# Patient Record
Sex: FEMALE | Race: WHITE | NOT HISPANIC OR LATINO | Employment: FULL TIME | URBAN - METROPOLITAN AREA
[De-identification: names, ages, dates, MRNs, and addresses within clinical notes are randomized per-mention and may not be internally consistent; named-entity substitution may affect disease eponyms.]

---

## 2023-12-11 DIAGNOSIS — Z30.41 FAMILY PLANNING, BCP (BIRTH CONTROL PILLS) MAINTENANCE: Primary | ICD-10-CM

## 2023-12-11 RX ORDER — LEVONORGESTREL/ETHINYL ESTRADIOL AND ETHINYL ESTRADIOL 100-20(84)
1 KIT ORAL EVERY 24 HOURS
COMMUNITY
Start: 2023-06-07 | End: 2023-12-11 | Stop reason: SDUPTHER

## 2023-12-11 RX ORDER — LEVONORGESTREL/ETHINYL ESTRADIOL AND ETHINYL ESTRADIOL 100-20(84)
1 KIT ORAL EVERY 24 HOURS
Qty: 84 TABLET | Refills: 1 | Status: SHIPPED | OUTPATIENT
Start: 2023-12-11 | End: 2024-05-27

## 2025-03-23 ENCOUNTER — APPOINTMENT (OUTPATIENT)
Dept: RADIOLOGY | Facility: HOSPITAL | Age: 50
End: 2025-03-23

## 2025-03-23 ENCOUNTER — HOSPITAL ENCOUNTER (EMERGENCY)
Facility: HOSPITAL | Age: 50
Discharge: HOME | End: 2025-03-23

## 2025-03-23 VITALS
HEIGHT: 65 IN | OXYGEN SATURATION: 98 % | WEIGHT: 255 LBS | SYSTOLIC BLOOD PRESSURE: 137 MMHG | HEART RATE: 69 BPM | DIASTOLIC BLOOD PRESSURE: 86 MMHG | TEMPERATURE: 97.9 F | BODY MASS INDEX: 42.49 KG/M2 | RESPIRATION RATE: 16 BRPM

## 2025-03-23 DIAGNOSIS — N93.9 ABNORMAL UTERINE BLEEDING: ICD-10-CM

## 2025-03-23 DIAGNOSIS — R10.2 PELVIC CRAMPING: Primary | ICD-10-CM

## 2025-03-23 LAB
ABO GROUP (TYPE) IN BLOOD: NORMAL
ALBUMIN SERPL BCP-MCNC: 3.8 G/DL (ref 3.4–5)
ALP SERPL-CCNC: 73 U/L (ref 33–110)
ALT SERPL W P-5'-P-CCNC: 13 U/L (ref 7–45)
ANION GAP SERPL CALCULATED.3IONS-SCNC: 10 MMOL/L (ref 10–20)
ANTIBODY SCREEN: NORMAL
APPEARANCE UR: ABNORMAL
AST SERPL W P-5'-P-CCNC: 12 U/L (ref 9–39)
BASOPHILS # BLD AUTO: 0.07 X10*3/UL (ref 0–0.1)
BASOPHILS NFR BLD AUTO: 0.8 %
BILIRUB SERPL-MCNC: 0.5 MG/DL (ref 0–1.2)
BILIRUB UR STRIP.AUTO-MCNC: NEGATIVE MG/DL
BUN SERPL-MCNC: 16 MG/DL (ref 6–23)
CALCIUM SERPL-MCNC: 8.7 MG/DL (ref 8.6–10.3)
CHLORIDE SERPL-SCNC: 108 MMOL/L (ref 98–107)
CO2 SERPL-SCNC: 25 MMOL/L (ref 21–32)
COLOR UR: ABNORMAL
CREAT SERPL-MCNC: 0.66 MG/DL (ref 0.5–1.05)
EGFRCR SERPLBLD CKD-EPI 2021: >90 ML/MIN/1.73M*2
EOSINOPHIL # BLD AUTO: 0.09 X10*3/UL (ref 0–0.7)
EOSINOPHIL NFR BLD AUTO: 1 %
ERYTHROCYTE [DISTWIDTH] IN BLOOD BY AUTOMATED COUNT: 13 % (ref 11.5–14.5)
GLUCOSE SERPL-MCNC: 92 MG/DL (ref 74–99)
GLUCOSE UR STRIP.AUTO-MCNC: NORMAL MG/DL
HCG UR QL IA.RAPID: NEGATIVE
HCT VFR BLD AUTO: 36.6 % (ref 36–46)
HGB BLD-MCNC: 12.3 G/DL (ref 12–16)
HOLD SPECIMEN: NORMAL
IMM GRANULOCYTES # BLD AUTO: 0.02 X10*3/UL (ref 0–0.7)
IMM GRANULOCYTES NFR BLD AUTO: 0.2 % (ref 0–0.9)
KETONES UR STRIP.AUTO-MCNC: ABNORMAL MG/DL
LEUKOCYTE ESTERASE UR QL STRIP.AUTO: ABNORMAL
LIPASE SERPL-CCNC: 21 U/L (ref 9–82)
LYMPHOCYTES # BLD AUTO: 1.68 X10*3/UL (ref 1.2–4.8)
LYMPHOCYTES NFR BLD AUTO: 18.6 %
MCH RBC QN AUTO: 29.4 PG (ref 26–34)
MCHC RBC AUTO-ENTMCNC: 33.6 G/DL (ref 32–36)
MCV RBC AUTO: 88 FL (ref 80–100)
MONOCYTES # BLD AUTO: 0.42 X10*3/UL (ref 0.1–1)
MONOCYTES NFR BLD AUTO: 4.7 %
NEUTROPHILS # BLD AUTO: 6.75 X10*3/UL (ref 1.2–7.7)
NEUTROPHILS NFR BLD AUTO: 74.7 %
NITRITE UR QL STRIP.AUTO: NEGATIVE
NRBC BLD-RTO: 0 /100 WBCS (ref 0–0)
PH UR STRIP.AUTO: 5 [PH]
PLATELET # BLD AUTO: 217 X10*3/UL (ref 150–450)
POTASSIUM SERPL-SCNC: 4 MMOL/L (ref 3.5–5.3)
PROT SERPL-MCNC: 6.7 G/DL (ref 6.4–8.2)
PROT UR STRIP.AUTO-MCNC: ABNORMAL MG/DL
RBC # BLD AUTO: 4.18 X10*6/UL (ref 4–5.2)
RBC # UR STRIP.AUTO: ABNORMAL MG/DL
RBC #/AREA URNS AUTO: >20 /HPF
RH FACTOR (ANTIGEN D): NORMAL
SODIUM SERPL-SCNC: 139 MMOL/L (ref 136–145)
SP GR UR STRIP.AUTO: 1.02
UROBILINOGEN UR STRIP.AUTO-MCNC: NORMAL MG/DL
WBC # BLD AUTO: 9 X10*3/UL (ref 4.4–11.3)
WBC #/AREA URNS AUTO: ABNORMAL /HPF

## 2025-03-23 PROCEDURE — 86901 BLOOD TYPING SEROLOGIC RH(D): CPT

## 2025-03-23 PROCEDURE — 87186 SC STD MICRODIL/AGAR DIL: CPT | Mod: WESLAB

## 2025-03-23 PROCEDURE — 76856 US EXAM PELVIC COMPLETE: CPT | Performed by: RADIOLOGY

## 2025-03-23 PROCEDURE — 36415 COLL VENOUS BLD VENIPUNCTURE: CPT

## 2025-03-23 PROCEDURE — 76830 TRANSVAGINAL US NON-OB: CPT | Performed by: RADIOLOGY

## 2025-03-23 PROCEDURE — 83690 ASSAY OF LIPASE: CPT

## 2025-03-23 PROCEDURE — 80053 COMPREHEN METABOLIC PANEL: CPT

## 2025-03-23 PROCEDURE — 2500000001 HC RX 250 WO HCPCS SELF ADMINISTERED DRUGS (ALT 637 FOR MEDICARE OP)

## 2025-03-23 PROCEDURE — 76856 US EXAM PELVIC COMPLETE: CPT

## 2025-03-23 PROCEDURE — 81001 URINALYSIS AUTO W/SCOPE: CPT

## 2025-03-23 PROCEDURE — 81025 URINE PREGNANCY TEST: CPT

## 2025-03-23 PROCEDURE — 85025 COMPLETE CBC W/AUTO DIFF WBC: CPT

## 2025-03-23 PROCEDURE — 99284 EMERGENCY DEPT VISIT MOD MDM: CPT | Mod: 25

## 2025-03-23 RX ORDER — MEDROXYPROGESTERONE ACETATE 5 MG/1
10 TABLET ORAL 2 TIMES DAILY
Qty: 40 TABLET | Refills: 0 | Status: SHIPPED | OUTPATIENT
Start: 2025-03-23 | End: 2025-05-13 | Stop reason: ALTCHOICE

## 2025-03-23 RX ORDER — IBUPROFEN 400 MG/1
400 TABLET, FILM COATED ORAL ONCE
Status: COMPLETED | OUTPATIENT
Start: 2025-03-23 | End: 2025-03-23

## 2025-03-23 RX ORDER — ACETAMINOPHEN 325 MG/1
975 TABLET ORAL ONCE
Status: COMPLETED | OUTPATIENT
Start: 2025-03-23 | End: 2025-03-23

## 2025-03-23 RX ADMIN — ACETAMINOPHEN 975 MG: 325 TABLET, FILM COATED ORAL at 09:46

## 2025-03-23 RX ADMIN — IBUPROFEN 400 MG: 400 TABLET, FILM COATED ORAL at 09:46

## 2025-03-23 ASSESSMENT — LIFESTYLE VARIABLES
TOTAL SCORE: 0
EVER FELT BAD OR GUILTY ABOUT YOUR DRINKING: NO
HAVE PEOPLE ANNOYED YOU BY CRITICIZING YOUR DRINKING: NO
HAVE YOU EVER FELT YOU SHOULD CUT DOWN ON YOUR DRINKING: NO
EVER HAD A DRINK FIRST THING IN THE MORNING TO STEADY YOUR NERVES TO GET RID OF A HANGOVER: NO

## 2025-03-23 ASSESSMENT — PAIN SCALES - GENERAL
PAINLEVEL_OUTOF10: 3
PAINLEVEL_OUTOF10: 10 - WORST POSSIBLE PAIN
PAINLEVEL_OUTOF10: 0 - NO PAIN

## 2025-03-23 ASSESSMENT — COLUMBIA-SUICIDE SEVERITY RATING SCALE - C-SSRS
1. IN THE PAST MONTH, HAVE YOU WISHED YOU WERE DEAD OR WISHED YOU COULD GO TO SLEEP AND NOT WAKE UP?: NO
6. HAVE YOU EVER DONE ANYTHING, STARTED TO DO ANYTHING, OR PREPARED TO DO ANYTHING TO END YOUR LIFE?: NO
2. HAVE YOU ACTUALLY HAD ANY THOUGHTS OF KILLING YOURSELF?: NO

## 2025-03-23 ASSESSMENT — PAIN DESCRIPTION - PAIN TYPE: TYPE: ACUTE PAIN

## 2025-03-23 ASSESSMENT — PAIN DESCRIPTION - LOCATION
LOCATION: PELVIS
LOCATION: ABDOMEN

## 2025-03-23 ASSESSMENT — PAIN - FUNCTIONAL ASSESSMENT: PAIN_FUNCTIONAL_ASSESSMENT: 0-10

## 2025-03-23 NOTE — ED PROVIDER NOTES
HPI   Chief Complaint   Patient presents with    Pelvic Pain     Since 3 a.m.       Patient is a 49-year-old female presents emergency department for evaluation of pelvic pain and heavy vaginal bleeding.  Patient states that she started her menstrual cycle this past Friday.  She states this morning when she woke up she had worsening pelvic pain which is worse than her typical menstrual cramps.  She states that she has had worsening vaginal bleeding and states it significantly heavy.  She states that she typically goes through 3 pads in a day, but states that today she is already gone through 2 pads since 3 AM.  She states the pain feels like a cramping stabbing sensation in the left adnexal area.  She denies any associated urinary symptoms or changes in bowel movements.  She denies any nausea or vomiting, fevers, chills, lightheadedness, dizziness.  She is not sexually active and is not concerned about STDs at this time.  She denies history of heavy vaginal cycles and follows with OB/GYN Dr. Oneill.      History provided by:  Patient   used: No            Patient History   No past medical history on file.  No past surgical history on file.  No family history on file.  Social History     Tobacco Use    Smoking status: Not on file    Smokeless tobacco: Not on file   Substance Use Topics    Alcohol use: Not on file    Drug use: Not on file       Physical Exam   ED Triage Vitals [03/23/25 0903]   Temperature Heart Rate Respirations BP   36.6 °C (97.9 °F) 67 18 (!) 147/100      Pulse Ox Temp Source Heart Rate Source Patient Position   97 % Temporal Monitor Sitting      BP Location FiO2 (%)     Right arm --       Physical Exam  Constitutional:       Appearance: Normal appearance.   Cardiovascular:      Rate and Rhythm: Normal rate and regular rhythm.   Pulmonary:      Effort: Pulmonary effort is normal.      Breath sounds: Normal breath sounds.   Abdominal:      General: Abdomen is flat.       Palpations: Abdomen is soft.      Tenderness: There is abdominal tenderness.      Comments: Tenderness to palpation to LLQ.   Musculoskeletal:         General: Normal range of motion.   Skin:     General: Skin is warm and dry.   Neurological:      General: No focal deficit present.      Mental Status: She is alert and oriented to person, place, and time.           ED Course & MDM   ED Course as of 03/25/25 1541   Sun Mar 23, 2025   1329 Spoke with OBGYN on call Dr. Zavala. If she is bleeding a significant amount can start provera 10 mg bid for 10 days and follow-up outpatient with OBGYN. [AF]      ED Course User Index  [AF] Vanessa Ziegler PA-C         Diagnoses as of 03/25/25 1541   Pelvic cramping   Abnormal uterine bleeding                 No data recorded     [place] Coma Scale Score: 15 (03/23/25 1345 : Elsa Tinsley RN)                           Medical Decision Making  Patient is a 49-year-old female presents emergency department for evaluation of pelvic pain and heavy vaginal bleeding.    Lab work done today included CMP, CBC, lipase, type and screen, urinalysis, urine pregnancy.  Lab work with urine showing leukocyte esterase with white blood cells otherwise unremarkable    Scans done today were interpreted/confirmed by radiologist and also interpreted by me which included pelvic ultrasound.  Ultrasound shows normal-sized uterus with normal endometrial thickness and no uterine mass with nonvisualization of the ovaries bilaterally.    Medications given at today's visit include p.o. Tylenol, p.o. ibuprofen    I saw this patient independently.  Patient remained stable while in the emergency department.  Pelvic exam was done which shows minimal amount of blood in vaginal vault with no active extravasation.  Blood counts remained stable and vital signs remained stable.  Ultrasound shows normal-sized uterus with normal endometrial thickness with no uterine mass.  Did speak with OB/GYN on-call Dr. Zavala to  discuss case and recommendations moving forward.  He recommends if patient's begins having significant heavy bleeding she can be started on Provera 10 mg twice daily for 10 days to follow-up closely with her OB/GYN outpatient.  Discussion was had with patient over Provera option moving forward and would prefer to have the medication if she begins to bleed heavily once again.  Therefore medication was prescribed and she is educated to follow-up closely with her OB/GYN Dr. Oneill outpatient for further evaluation.  She is educated on close return precautions and bleeding precautions.  She does have leukocyte esterase and white blood cells, but patient not have any active symptoms at this time and will let go to culture prior to treating as patient not having active symptoms at this time.      All labs, imaging, and diagnostic studies were reviewed by me and patient was counseled on clinical impression, expectations, and plan.  Patient was educated to follow-up with PCP in the following 1-2 days.  All questions from patient were answered. They elicited understanding and were agreeable to course of treatment.  Patient was discharged in stable condition and given strict return precautions.    ** Disclaimer:  Parts of this document were written utilizing a voice to text dictation software.  Note may contain minor transcription or typographical errors that were inadvertently transcribed by the computer software.        Procedure  Procedures     Vanessa Ziegler PA-C  03/25/25 5173

## 2025-03-23 NOTE — DISCHARGE INSTRUCTIONS
Please follow-up closely with your primary care provider in the following week.  Follow-up closely with OB/GYN.  Take medication as prescribed should you persist to have significant heavy vaginal bleeding.  Please return for any new or worsening symptoms.

## 2025-03-23 NOTE — ED TRIAGE NOTES
Pt ambulatory into triage c/o severe left sided pelvic pain accompanied by heaving bleeding. She started her period on Friday, but the severe pain started around 3 a.m. Pt states the pain has never been this bad before. She rates the pain a 12/10 & has been radiating to her back on the left side. She has been bleeding more than normal, & normally goes through 3 pads a day, but has already gone through 2 pads this morning. There have been blood clots on her pads. Pt denies any possibility that she might be pregnant.

## 2025-03-25 LAB — BACTERIA UR CULT: ABNORMAL

## 2025-03-26 ENCOUNTER — TELEPHONE (OUTPATIENT)
Dept: PHARMACY | Facility: HOSPITAL | Age: 50
End: 2025-03-26

## 2025-03-26 NOTE — PROGRESS NOTES
EDPD Note: Rapid Result Review    I reviewed Tamie Guzman 's chart regarding a positive urine culture/result that was taken during their recent emergency room visit. The patient was not told about these results prior to leaving the emergency department. Therefore, patient was contacted and given appropriate education.     Presented to ED for pelvic pain and heavy vaginal bleeding. No urinary sx endorsed. No abx at discharge. Today pt did not have any urinary sx. Counseled pt on urine cx and recommended she did not need treatment. Patient agreed and had no further questions.     Susceptibility data from last 90 days.  Collected Specimen Info Organism Ampicillin Cefazolin Cefazolin (uncomplicated UTIs only) Ciprofloxacin Gentamicin Nitrofurantoin Piperacillin/Tazobactam Trimethoprim/Sulfamethoxazole   03/23/25 Urine from Clean Catch/Voided Escherichia coli  S  S  S  S  S  S  S  S     Admission on 03/23/2025, Discharged on 03/23/2025   Component Date Value Ref Range Status    WBC 03/23/2025 9.0  4.4 - 11.3 x10*3/uL Final    nRBC 03/23/2025 0.0  0.0 - 0.0 /100 WBCs Final    RBC 03/23/2025 4.18  4.00 - 5.20 x10*6/uL Final    Hemoglobin 03/23/2025 12.3  12.0 - 16.0 g/dL Final    Hematocrit 03/23/2025 36.6  36.0 - 46.0 % Final    MCV 03/23/2025 88  80 - 100 fL Final    MCH 03/23/2025 29.4  26.0 - 34.0 pg Final    MCHC 03/23/2025 33.6  32.0 - 36.0 g/dL Final    RDW 03/23/2025 13.0  11.5 - 14.5 % Final    Platelets 03/23/2025 217  150 - 450 x10*3/uL Final    Neutrophils % 03/23/2025 74.7  40.0 - 80.0 % Final    Immature Granulocytes %, Automated 03/23/2025 0.2  0.0 - 0.9 % Final    Immature Granulocyte Count (IG) includes promyelocytes, myelocytes and metamyelocytes but does not include bands. Percent differential counts (%) should be interpreted in the context of the absolute cell counts (cells/UL).    Lymphocytes % 03/23/2025 18.6  13.0 - 44.0 % Final    Monocytes % 03/23/2025 4.7  2.0 - 10.0 % Final    Eosinophils %  03/23/2025 1.0  0.0 - 6.0 % Final    Basophils % 03/23/2025 0.8  0.0 - 2.0 % Final    Neutrophils Absolute 03/23/2025 6.75  1.20 - 7.70 x10*3/uL Final    Percent differential counts (%) should be interpreted in the context of the absolute cell counts (cells/uL).    Immature Granulocytes Absolute, Au* 03/23/2025 0.02  0.00 - 0.70 x10*3/uL Final    Lymphocytes Absolute 03/23/2025 1.68  1.20 - 4.80 x10*3/uL Final    Monocytes Absolute 03/23/2025 0.42  0.10 - 1.00 x10*3/uL Final    Eosinophils Absolute 03/23/2025 0.09  0.00 - 0.70 x10*3/uL Final    Basophils Absolute 03/23/2025 0.07  0.00 - 0.10 x10*3/uL Final    Glucose 03/23/2025 92  74 - 99 mg/dL Final    Sodium 03/23/2025 139  136 - 145 mmol/L Final    Potassium 03/23/2025 4.0  3.5 - 5.3 mmol/L Final    Chloride 03/23/2025 108 (H)  98 - 107 mmol/L Final    Bicarbonate 03/23/2025 25  21 - 32 mmol/L Final    Anion Gap 03/23/2025 10  10 - 20 mmol/L Final    Urea Nitrogen 03/23/2025 16  6 - 23 mg/dL Final    Creatinine 03/23/2025 0.66  0.50 - 1.05 mg/dL Final    eGFR 03/23/2025 >90  >60 mL/min/1.73m*2 Final    Calculations of estimated GFR are performed using the 2021 CKD-EPI Study Refit equation without the race variable for the IDMS-Traceable creatinine methods.  https://jasn.asnjournals.org/content/early/2021/09/22/ASN.0645350295    Calcium 03/23/2025 8.7  8.6 - 10.3 mg/dL Final    Albumin 03/23/2025 3.8  3.4 - 5.0 g/dL Final    Alkaline Phosphatase 03/23/2025 73  33 - 110 U/L Final    Total Protein 03/23/2025 6.7  6.4 - 8.2 g/dL Final    AST 03/23/2025 12  9 - 39 U/L Final    Bilirubin, Total 03/23/2025 0.5  0.0 - 1.2 mg/dL Final    ALT 03/23/2025 13  7 - 45 U/L Final    Patients treated with Sulfasalazine may generate falsely decreased results for ALT.    Lipase 03/23/2025 21  9 - 82 U/L Final    ABO TYPE 03/23/2025 B   Final    Rh TYPE 03/23/2025 POS   Final    ANTIBODY SCREEN 03/23/2025 NEG   Final    HCG, Urine 03/23/2025 NEGATIVE  NEGATIVE Final    Color, Urine  03/23/2025 Dark-Brown (N)  Light-Yellow, Yellow, Dark-Yellow Final    Appearance, Urine 03/23/2025 Ex.Turbid (N)  Clear Final    Specific Gravity, Urine 03/23/2025 1.024  1.005 - 1.035 Final    pH, Urine 03/23/2025 5.0  5.0, 5.5, 6.0, 6.5, 7.0, 7.5, 8.0 Final    Protein, Urine 03/23/2025 50 (1+) (A)  NEGATIVE, 10 (TRACE), 20 (TRACE) mg/dL Final    Glucose, Urine 03/23/2025 Normal  Normal mg/dL Final    Blood, Urine 03/23/2025 OVER (3+) (A)  NEGATIVE mg/dL Final    Ketones, Urine 03/23/2025 TRACE (A)  NEGATIVE mg/dL Final    Bilirubin, Urine 03/23/2025 NEGATIVE  NEGATIVE mg/dL Final    Urobilinogen, Urine 03/23/2025 Normal  Normal mg/dL Final    Nitrite, Urine 03/23/2025 NEGATIVE  NEGATIVE Final    Leukocyte Esterase, Urine 03/23/2025 250 Olga/uL (A)  NEGATIVE Final    Extra Tube 03/23/2025 Hold for add-ons.   Final    Auto resulted.    WBC, Urine 03/23/2025 11-20 (A)  1-5, NONE /HPF Final    RBC, Urine 03/23/2025 >20 (A)  NONE, 1-2, 3-5 /HPF Final    Urine Culture 03/23/2025 20,000 - 80,000 CFU/mL Escherichia coli (A)   Final       No further follow up needed from EDPD Team.     If there are any other questions for the ED Post-Discharge Culture Follow Up Team, please contact [phone]. Fax: [phone].    Azucena Matute PharmD

## 2025-05-13 ENCOUNTER — OFFICE VISIT (OUTPATIENT)
Facility: CLINIC | Age: 50
End: 2025-05-13
Payer: COMMERCIAL

## 2025-05-13 VITALS
WEIGHT: 267.9 LBS | HEIGHT: 65 IN | BODY MASS INDEX: 44.64 KG/M2 | SYSTOLIC BLOOD PRESSURE: 142 MMHG | DIASTOLIC BLOOD PRESSURE: 96 MMHG

## 2025-05-13 DIAGNOSIS — N92.0 MENORRHAGIA WITH REGULAR CYCLE: ICD-10-CM

## 2025-05-13 DIAGNOSIS — Z12.31 SCREENING MAMMOGRAM FOR BREAST CANCER: ICD-10-CM

## 2025-05-13 DIAGNOSIS — Z01.419 WELL WOMAN EXAM WITH ROUTINE GYNECOLOGICAL EXAM: Primary | ICD-10-CM

## 2025-05-13 PROCEDURE — 99213 OFFICE O/P EST LOW 20 MIN: CPT | Performed by: OBSTETRICS & GYNECOLOGY

## 2025-05-13 PROCEDURE — 99386 PREV VISIT NEW AGE 40-64: CPT | Mod: 25 | Performed by: OBSTETRICS & GYNECOLOGY

## 2025-05-13 RX ORDER — LOSARTAN POTASSIUM 50 MG/1
50 TABLET ORAL DAILY
COMMUNITY

## 2025-05-13 ASSESSMENT — PATIENT HEALTH QUESTIONNAIRE - PHQ9
1. LITTLE INTEREST OR PLEASURE IN DOING THINGS: NOT AT ALL
2. FEELING DOWN, DEPRESSED OR HOPELESS: NOT AT ALL
SUM OF ALL RESPONSES TO PHQ9 QUESTIONS 1 AND 2: 0

## 2025-05-13 ASSESSMENT — ENCOUNTER SYMPTOMS
DEPRESSION: 0
LOSS OF SENSATION IN FEET: 0
OCCASIONAL FEELINGS OF UNSTEADINESS: 0

## 2025-05-13 ASSESSMENT — PAIN SCALES - GENERAL: PAINLEVEL_OUTOF10: 0-NO PAIN

## 2025-05-13 NOTE — PROGRESS NOTES
Subjective     History of Present Illness  Tamie Guzman is a 49 year old female who presents for a well woman exam and evaluation of menorrhagia.    Menstrual cycles are irregular, with some being normal and others heavy. The current cycle is light. During heavy cycles, she changes pads hourly and passes clots no larger than a half dollar. Significant pain, particularly in the left ovary, led to an emergency room visit in early March. An ultrasound at that time did not visualize the ovaries due to bowel gas.    She has a history of cramping between periods and was previously on birth control to manage this pain. She is not currently on any birth control. Her weight increased by ten pounds recently but has since stabilized.    In March, an ultrasound showed a uterus measuring 7.1 x 3.9 x 5.0 cm with Nabothian cysts and no masses. The uterine lining was 0.6 cm thick. Ovaries were not visible due to bowel gas. Blood work from the ER visit showed normal results, including a hemoglobin level of 12.3. E. coli was present in her urine but was not treated with antibiotics.    She has a history of low-grade dysplasia, but her last Pap smear in  was negative for HPV and abnormalities. Previous Pap smears in  and  were also negative, although she had a positive HPV result in 2019.      Last pap:  : neg/neg  Last mammogramnone  Current contraception: none  History of abnormal Pap smear: no  Family history of uterine or ovarian cancer: no  Regular self breast exam: no  History of abnormal mammogram: no  Family history of breast cancer: no  History of abnormal lipids: no  Menstrual History:  OB History          1    Para   1    Term   1            AB        Living   1         SAB        IAB        Ectopic        Multiple        Live Births   1                  Patient's last menstrual period was 2025 (approximate).         Medical History[1]    Surgical History[2]    Social History      Socioeconomic History    Marital status: Single     Spouse name: Not on file    Number of children: Not on file    Years of education: Not on file    Highest education level: Not on file   Occupational History    Not on file   Tobacco Use    Smoking status: Never    Smokeless tobacco: Never   Vaping Use    Vaping status: Never Used   Substance and Sexual Activity    Alcohol use: Not Currently    Drug use: Not Currently    Sexual activity: Yes     Birth control/protection: None   Other Topics Concern    Not on file   Social History Narrative    Not on file     Social Drivers of Health     Financial Resource Strain: Unknown (12/26/2022)    Received from [facility]    Overall Financial Resource Strain (CARDIA)     Difficulty of Paying Living Expenses: Patient declined   Food Insecurity: Unknown (12/26/2022)    Received from [facility]    Hunger Vital Sign     Worried About Running Out of Food in the Last Year: Patient declined     Ran Out of Food in the Last Year: Patient declined   Transportation Needs: No Transportation Needs (12/26/2022)    Received from [facility]    PRAPARE - Transportation     Lack of Transportation (Medical): No     Lack of Transportation (Non-Medical): No   Physical Activity: Unknown (12/26/2022)    Received from [facility]    Exercise Vital Sign     Days of Exercise per Week: Patient declined     Minutes of Exercise per Session: Patient declined   Stress: Stress Concern Present (12/26/2022)    Received from [facility]    [employer] - Occupational Stress Questionnaire     Feeling of Stress : To some extent   Social Connections: Socially Isolated (12/26/2022)    Received from [facility]    Social Connection and Isolation Panel [NHANES]     Frequency of Communication with Friends and Family: More than three times a week     Frequency of Social Gatherings with Friends and Family: Once a week     Attends Hinduism Services: Never  "    Active Member of Clubs or Organizations: No     Attends Club or Organization Meetings: Patient declined     Marital Status: Never    Intimate Partner Violence: Not on file   Housing Stability: Low Risk  (12/26/2022)    Received from [facility]    Housing Stability Vital Sign     Unable to Pay for Housing in the Last Year: No     Number of Places Lived in the Last Year: 1     Unstable Housing in the Last Year: No       Family History[3]    Prior to Admission medications    Medication Sig Start Date End Date Taking? Authorizing Provider   losartan (Cozaar) 50 mg tablet Take 1 tablet (50 mg) by mouth once daily.   Yes Historical Provider, MD Kimber Barrow 0.1 mg-20 mcg (84)/10 mcg (7) tablets,dose pack,3 month Take 1 tablet by mouth once every 24 hours.  Patient not taking: Reported on 5/13/2025 12/11/23 5/27/24  Qian Oneill DO   medroxyPROGESTERone (Provera) 5 mg tablet Take 2 tablets (10 mg) by mouth 2 times a day for 10 days.  Patient not taking: Reported on 5/13/2025 3/23/25 4/2/25  Vanessa Ziegler PA-C       Allergies[4]           Objective   BP (!) 142/96   Ht 1.651 m (5' 5\")   Wt 122 kg (267 lb 14.4 oz)   LMP 04/20/2025 (Approximate)   BMI 44.58 kg/m²     Physical Exam  Vitals and nursing note reviewed.   Constitutional:       General: She is not in acute distress.     Appearance: Normal appearance. She is not ill-appearing.   HENT:      Head: Normocephalic and atraumatic.      Mouth/Throat:      Mouth: Mucous membranes are moist.      Pharynx: Oropharynx is clear.   Eyes:      Extraocular Movements: Extraocular movements intact.      Conjunctiva/sclera: Conjunctivae normal.      Pupils: Pupils are equal, round, and reactive to light.   Neck:      Thyroid: No thyroid mass, thyromegaly or thyroid tenderness.   Cardiovascular:      Rate and Rhythm: Normal rate and regular rhythm.      Pulses: Normal pulses.      Heart sounds: Normal heart sounds. No murmur heard.  Pulmonary:      Effort: " Pulmonary effort is normal.      Breath sounds: No wheezing or rhonchi.   Chest:   Breasts:     Right: Normal. No swelling, bleeding, inverted nipple, mass, nipple discharge, skin change or tenderness.      Left: Normal. No swelling, bleeding, inverted nipple, mass, nipple discharge, skin change or tenderness.   Abdominal:      General: Bowel sounds are normal. There is no distension.      Palpations: There is no mass.      Tenderness: There is no abdominal tenderness. There is no guarding or rebound.      Hernia: No hernia is present. There is no hernia in the left inguinal area or right inguinal area.   Genitourinary:     General: Normal vulva.      Pubic Area: No rash.       Labia:         Right: No rash, tenderness, lesion or injury.         Left: No rash, tenderness, lesion or injury.       Urethra: No prolapse or urethral swelling.      Vagina: No signs of injury. No vaginal discharge, tenderness or prolapsed vaginal walls.      Cervix: No cervical motion tenderness, discharge, friability, lesion, erythema or cervical bleeding.      Uterus: Not deviated, not enlarged, not fixed, not tender and no uterine prolapse.       Comments: Adnexa not palpable  Musculoskeletal:         General: No swelling, tenderness, deformity or signs of injury. Normal range of motion.      Cervical back: No rigidity.   Lymphadenopathy:      Cervical: No cervical adenopathy.      Upper Body:      Right upper body: No axillary adenopathy.      Left upper body: No axillary adenopathy.      Lower Body: No right inguinal adenopathy. No left inguinal adenopathy.   Skin:     General: Skin is warm.      Findings: No lesion or rash.   Neurological:      General: No focal deficit present.      Mental Status: She is alert and oriented to person, place, and time.   Psychiatric:         Mood and Affect: Mood normal.         Behavior: Behavior normal.         Thought Content: Thought content normal.         Judgment: Judgment normal.            Assessment & Plan  Menorrhagia  Menorrhagia with regular cycles but heavy bleeding, requiring hourly pad changes and passing clots up to the size of a half dollar. Recent ultrasound showed no uterine masses and a lining thickness of 0.6 cm. Ovaries were not visualized due to bowel gas. Hemoglobin is 12.3, indicating no significant anemia. Differential includes fibroids, but ultrasound findings are not suggestive. Plan to further evaluate with endometrial biopsy. Discussed potential management options including birth control and IUD, with the latter offering a 99.9% reduction in bleeding and lasting up to eight years, potentially avoiding surgery.  - Order endometrial biopsy  - Order blood work including estradiol, FSH, thyroid, and diabetes screen  - Discuss potential use of birth control or IUD for management of bleeding    Ovarian pain  Intermittent ovarian pain, previously evaluated in the emergency room with ultrasound and blood work. Pain has since resolved. Ultrasound did not visualize ovaries due to bowel gas. No current indication for repeat ultrasound. Discussed that the pain may be related to ovulation and that weight changes can affect ovarian function.  - Reassess if pain recurs    Low-grade dysplasia  Low-grade dysplasia with previous positive HPV. Recent Pap smears in 2023 and 2022 were negative for HPV and abnormalities. HPV has cleared, and no dysplasia has been noted in recent years. No current need for Pap smear. If future uterine surgery is needed, removal of the cervix would be considered due to history.  - Continue routine surveillance with Pap smears as per guidelines    General Health Maintenance  She is due for a mammogram. Discussed the importance of regular screenings and provided information on scheduling.  - Order mammogram  - Assist with scheduling mammogram    Assessment    Problem List Items Addressed This Visit    None  Visit Diagnoses         Well woman exam with routine  gynecological exam    -  Primary      Menorrhagia with regular cycle        Relevant Orders    Estradiol    Follicle Stimulating Hormone    TSH with reflex to Free T4 if abnormal    Hemoglobin A1C    Prolactin    Testosterone,Free and Total      Screening mammogram for breast cancer        Relevant Orders    BI mammo bilateral screening tomosynthesis             No follow-ups on file.   All questions answered.  Breast self exam technique reviewed and patient encouraged to perform self-exam monthly.  Diagnosis explained in detail, including differential.  Discussed healthy lifestyle modifications.  Mammogram..    This medical note was created with the assistance of artificial intelligence (AI) for documentation purposes. The content has been reviewed and confirmed by the healthcare provider for accuracy and completeness. Patient consented to the use of audio recording and use of AI during their visit.          [1]   Past Medical History:  Diagnosis Date    Hypertension    [2]   Past Surgical History:  Procedure Laterality Date     SECTION, LOW TRANSVERSE     [3]   Family History  Problem Relation Name Age of Onset    Other (partial colon removal) Mother      Hypertension Father     [4]   Allergies  Allergen Reactions    Levaquin [Levofloxacin] Shortness of breath    Amoxicillin Hives

## 2025-06-03 ENCOUNTER — APPOINTMENT (OUTPATIENT)
Dept: RADIOLOGY | Facility: HOSPITAL | Age: 50
End: 2025-06-03
Payer: COMMERCIAL

## 2025-06-11 ENCOUNTER — APPOINTMENT (OUTPATIENT)
Facility: CLINIC | Age: 50
End: 2025-06-11
Payer: COMMERCIAL

## 2025-06-30 ENCOUNTER — TELEPHONE (OUTPATIENT)
Facility: CLINIC | Age: 50
End: 2025-06-30
Payer: COMMERCIAL

## 2025-06-30 DIAGNOSIS — Z00.00 ENCOUNTER FOR MEDICAL EXAMINATION TO ESTABLISH CARE: Primary | ICD-10-CM

## 2025-07-23 ENCOUNTER — APPOINTMENT (OUTPATIENT)
Dept: PRIMARY CARE | Facility: CLINIC | Age: 50
End: 2025-07-23
Payer: COMMERCIAL

## 2025-07-25 ENCOUNTER — APPOINTMENT (OUTPATIENT)
Dept: PRIMARY CARE | Facility: CLINIC | Age: 50
End: 2025-07-25
Payer: COMMERCIAL

## 2025-08-04 ENCOUNTER — OFFICE VISIT (OUTPATIENT)
Facility: CLINIC | Age: 50
End: 2025-08-04
Payer: COMMERCIAL

## 2025-08-04 VITALS
SYSTOLIC BLOOD PRESSURE: 111 MMHG | WEIGHT: 249 LBS | HEIGHT: 65 IN | DIASTOLIC BLOOD PRESSURE: 91 MMHG | BODY MASS INDEX: 41.48 KG/M2

## 2025-08-04 DIAGNOSIS — N92.0 MENORRHAGIA WITH REGULAR CYCLE: Primary | ICD-10-CM

## 2025-08-04 PROCEDURE — 3008F BODY MASS INDEX DOCD: CPT | Performed by: OBSTETRICS & GYNECOLOGY

## 2025-08-04 PROCEDURE — 99213 OFFICE O/P EST LOW 20 MIN: CPT | Performed by: OBSTETRICS & GYNECOLOGY

## 2025-08-04 RX ORDER — MELOXICAM 15 MG/1
15 TABLET ORAL DAILY
COMMUNITY

## 2025-08-04 ASSESSMENT — ENCOUNTER SYMPTOMS
LOSS OF SENSATION IN FEET: 0
DEPRESSION: 0
OCCASIONAL FEELINGS OF UNSTEADINESS: 0

## 2025-08-04 ASSESSMENT — PAIN SCALES - GENERAL: PAINLEVEL_OUTOF10: 0-NO PAIN

## 2025-08-04 ASSESSMENT — PATIENT HEALTH QUESTIONNAIRE - PHQ9
2. FEELING DOWN, DEPRESSED OR HOPELESS: NOT AT ALL
SUM OF ALL RESPONSES TO PHQ9 QUESTIONS 1 AND 2: 0
1. LITTLE INTEREST OR PLEASURE IN DOING THINGS: NOT AT ALL

## 2025-08-04 NOTE — PROGRESS NOTES
GYN OFFICE VISIT    Patient Name:  Tamie Guzman  :  1975  MR #:  [id number]  Acct #:  [id number]      ASSESSMENT/PLAN:     There are no diagnoses linked to this encounter.     There are no diagnoses linked to this encounter.    No problem-specific Assessment & Plan notes found for this encounter.    Assessment & Plan           .{plan; gyn:84767}    No follow-ups on file.      Subjective    Chief Complaint   Patient presents with    Metrorrhagia       Tamie Guzman is a 49 y.o.  Patient's last menstrual period was 2025 (exact date).   female who presents for ***  History of Present Illness         Medical History[1]    Surgical History[2]    Social History     Socioeconomic History    Marital status: Single     Spouse name: Not on file    Number of children: Not on file    Years of education: Not on file    Highest education level: Not on file   Occupational History    Not on file   Tobacco Use    Smoking status: Never    Smokeless tobacco: Never   Vaping Use    Vaping status: Never Used   Substance and Sexual Activity    Alcohol use: Not Currently    Drug use: Never    Sexual activity: Yes     Partners: Male     Birth control/protection: None   Other Topics Concern    Not on file   Social History Narrative    Not on file     Social Drivers of Health     Financial Resource Strain: Unknown (2022)    Received from [facility]    Overall Financial Resource Strain (CARDIA)     Difficulty of Paying Living Expenses: Patient declined   Food Insecurity: Unknown (2022)    Received from [facility]    Hunger Vital Sign     Worried About Running Out of Food in the Last Year: Patient declined     Ran Out of Food in the Last Year: Patient declined   Transportation Needs: No Transportation Needs (2022)    Received from [facility]    PRAPARE - Transportation     Lack of Transportation (Medical): No     Lack of Transportation (Non-Medical): No   Physical Activity: Unknown  "(12/26/2022)    Received from [facility]    Exercise Vital Sign     Days of Exercise per Week: Patient declined     Minutes of Exercise per Session: Patient declined   Stress: Stress Concern Present (12/26/2022)    Received from [facility]    [employer] - Occupational Stress Questionnaire     Feeling of Stress : To some extent   Social Connections: Socially Isolated (12/26/2022)    Received from [facility]    Social Connection and Isolation Panel     Frequency of Communication with Friends and Family: More than three times a week     Frequency of Social Gatherings with Friends and Family: Once a week     Attends [employer] Services: Never     Active Member of Clubs or Organizations: No     Attends Club or Organization Meetings: Patient declined     Marital Status: Never    Intimate Partner Violence: Not on file   Housing Stability: Low Risk  (12/26/2022)    Received from [facility]    Housing Stability Vital Sign     Unable to Pay for Housing in the Last Year: No     Number of Places Lived in the Last Year: 1     Unstable Housing in the Last Year: No       Family History[3]    Prior to Admission medications   Medication Sig Start Date End Date Taking? Authorizing Provider   meloxicam (Mobic) 15 mg tablet Take 1 tablet (15 mg) by mouth once daily.   Yes Historical Provider, MD   losartan (Cozaar) 50 mg tablet Take 1 tablet (50 mg) by mouth once daily.    Historical Provider, MD       Allergies[4]           OBJECTIVE:   BP (!) 111/91   Ht 1.651 m (5' 5\")   Wt 113 kg (249 lb)   LMP 07/18/2025 (Exact Date)   BMI 41.44 kg/m²   Body mass index is 41.44 kg/m².     Physical Exam    Results       Labs reviewed:    ***    Imaging reviewed***    Note: This dictation was generated using Dragon voice recognition software. Please excuse any grammatical or spelling errors that may have occurred using the system.    This medical note was created with the assistance of " artificial intelligence (AI) for documentation purposes. The content has been reviewed and confirmed by the healthcare provider for accuracy and completeness. Patient consented to the use of audio recording and use of AI during their visit.             [1]   Past Medical History:  Diagnosis Date    Hypertension    [2]   Past Surgical History:  Procedure Laterality Date     SECTION, LOW TRANSVERSE     [3]   Family History  Problem Relation Name Age of Onset    Other (partial colon removal) Mother      Hypertension Father     [4]   Allergies  Allergen Reactions    Levaquin [Levofloxacin] Shortness of breath    Amoxicillin Hives      kg/m².     Physical Exam    Results       Labs reviewed:    Pending.    Imaging reviewe    US PELVIS TRANSABDOMINAL WITH TRANSVAGINAL; 3/23/2025 11:54 am      INDICATION:  Signs/Symptoms: Severe left-sided pelvic pain, heavy vaginal bleeding.      COMPARISON:  2007      ACCESSION NUMBER(S):  [id number]      ORDERING CLINICIAN:  ELADIO MARIE      TECHNIQUE:  Grayscale and color Doppler imaging of the pelvis were performed.  Transabdominal technique was utilized as well as transvaginal  ultrasound to better visualize the adnexa.      FINDINGS:  Uterus:  Size: 7.1 cm x 3.9 cm x 5.0 cm. There are a number of nabothian cysts  within the cervix. No uterine mass is seen. Endometrial Thickness:  0.6 cm. Ovaries: Neither the right nor left ovary is visualized  transabdominally or transvaginally due to body habitus and overlying  bowel gas.      There is no free fluid in the pelvis.      IMPRESSION:  Normal-sized uterus with normal endometrial thickness and no uterine  mass.        Note: This dictation was generated using Dragon voice recognition software. Please excuse any grammatical or spelling errors that may have occurred using the system.    This medical note was created with the assistance of artificial intelligence (AI) for documentation purposes. The content has been reviewed and confirmed by the healthcare provider for accuracy and completeness. Patient consented to the use of audio recording and use of AI during their visit.             [1]   Past Medical History:  Diagnosis Date    Hypertension    [2]   Past Surgical History:  Procedure Laterality Date     SECTION, LOW TRANSVERSE     [3]   Family History  Problem Relation Name Age of Onset    Other (partial colon removal) Mother      Hypertension Father     [4]   Allergies  Allergen Reactions    Levaquin [Levofloxacin] Shortness of breath    Amoxicillin Hives

## 2025-08-08 ENCOUNTER — OFFICE VISIT (OUTPATIENT)
Dept: PRIMARY CARE | Facility: CLINIC | Age: 50
End: 2025-08-08
Payer: COMMERCIAL

## 2025-08-08 VITALS
DIASTOLIC BLOOD PRESSURE: 90 MMHG | TEMPERATURE: 98.1 F | SYSTOLIC BLOOD PRESSURE: 154 MMHG | HEIGHT: 63 IN | WEIGHT: 252 LBS | BODY MASS INDEX: 44.65 KG/M2 | OXYGEN SATURATION: 96 % | HEART RATE: 62 BPM

## 2025-08-08 DIAGNOSIS — Z13.6 ENCOUNTER FOR SCREENING FOR CORONARY ARTERY DISEASE: ICD-10-CM

## 2025-08-08 DIAGNOSIS — Z12.31 ENCOUNTER FOR SCREENING MAMMOGRAM FOR MALIGNANT NEOPLASM OF BREAST: ICD-10-CM

## 2025-08-08 DIAGNOSIS — E66.01 MORBID OBESITY (MULTI): ICD-10-CM

## 2025-08-08 DIAGNOSIS — I10 PRIMARY HYPERTENSION: ICD-10-CM

## 2025-08-08 DIAGNOSIS — Z12.11 ENCOUNTER FOR SCREENING FOR MALIGNANT NEOPLASM OF COLON: ICD-10-CM

## 2025-08-08 DIAGNOSIS — M54.50 ACUTE LOW BACK PAIN, UNSPECIFIED BACK PAIN LATERALITY, UNSPECIFIED WHETHER SCIATICA PRESENT: ICD-10-CM

## 2025-08-08 DIAGNOSIS — Z00.00 ANNUAL PHYSICAL EXAM: Primary | ICD-10-CM

## 2025-08-08 DIAGNOSIS — L91.8 SKIN TAG: ICD-10-CM

## 2025-08-08 DIAGNOSIS — Z11.3 ROUTINE SCREENING FOR STI (SEXUALLY TRANSMITTED INFECTION): ICD-10-CM

## 2025-08-08 DIAGNOSIS — Z11.59 ENCOUNTER FOR HEPATITIS C SCREENING TEST FOR LOW RISK PATIENT: ICD-10-CM

## 2025-08-08 DIAGNOSIS — D22.9 ATYPICAL NEVI: ICD-10-CM

## 2025-08-08 DIAGNOSIS — Z13.220 LIPID SCREENING: ICD-10-CM

## 2025-08-08 DIAGNOSIS — Z12.4 CERVICAL CANCER SCREENING: ICD-10-CM

## 2025-08-08 DIAGNOSIS — Z71.85 IMMUNIZATION COUNSELING: ICD-10-CM

## 2025-08-08 DIAGNOSIS — Z12.31 BREAST CANCER SCREENING BY MAMMOGRAM: ICD-10-CM

## 2025-08-08 DIAGNOSIS — E55.9 VITAMIN D DEFICIENCY: ICD-10-CM

## 2025-08-08 PROCEDURE — 3008F BODY MASS INDEX DOCD: CPT | Performed by: FAMILY MEDICINE

## 2025-08-08 PROCEDURE — 1036F TOBACCO NON-USER: CPT | Performed by: FAMILY MEDICINE

## 2025-08-08 PROCEDURE — 3077F SYST BP >= 140 MM HG: CPT | Performed by: FAMILY MEDICINE

## 2025-08-08 PROCEDURE — 99386 PREV VISIT NEW AGE 40-64: CPT | Performed by: FAMILY MEDICINE

## 2025-08-08 PROCEDURE — 3080F DIAST BP >= 90 MM HG: CPT | Performed by: FAMILY MEDICINE

## 2025-08-08 RX ORDER — METHYLPREDNISOLONE 4 MG/1
TABLET ORAL
Qty: 21 TABLET | Refills: 0 | Status: SHIPPED | OUTPATIENT
Start: 2025-08-08 | End: 2025-08-14

## 2025-08-08 RX ORDER — LOSARTAN POTASSIUM 100 MG/1
100 TABLET ORAL DAILY
Qty: 90 TABLET | Refills: 3 | Status: SHIPPED | OUTPATIENT
Start: 2025-08-08 | End: 2026-08-08

## 2025-08-08 RX ORDER — CYCLOBENZAPRINE HCL 10 MG
10 TABLET ORAL NIGHTLY PRN
Qty: 30 TABLET | Refills: 0 | Status: SHIPPED | OUTPATIENT
Start: 2025-08-08 | End: 2025-10-07

## 2025-08-08 ASSESSMENT — ANXIETY QUESTIONNAIRES
IF YOU CHECKED OFF ANY PROBLEMS ON THIS QUESTIONNAIRE, HOW DIFFICULT HAVE THESE PROBLEMS MADE IT FOR YOU TO DO YOUR WORK, TAKE CARE OF THINGS AT HOME, OR GET ALONG WITH OTHER PEOPLE: NOT DIFFICULT AT ALL
3. WORRYING TOO MUCH ABOUT DIFFERENT THINGS: SEVERAL DAYS
7. FEELING AFRAID AS IF SOMETHING AWFUL MIGHT HAPPEN: NOT AT ALL
GAD7 TOTAL SCORE: 3
5. BEING SO RESTLESS THAT IT IS HARD TO SIT STILL: NOT AT ALL
2. NOT BEING ABLE TO STOP OR CONTROL WORRYING: SEVERAL DAYS
6. BECOMING EASILY ANNOYED OR IRRITABLE: SEVERAL DAYS
4. TROUBLE RELAXING: NOT AT ALL
1. FEELING NERVOUS, ANXIOUS, OR ON EDGE: NOT AT ALL

## 2025-08-08 ASSESSMENT — PAIN SCALES - GENERAL: PAINLEVEL_OUTOF10: 6

## 2025-08-08 ASSESSMENT — COLUMBIA-SUICIDE SEVERITY RATING SCALE - C-SSRS
2. HAVE YOU ACTUALLY HAD ANY THOUGHTS OF KILLING YOURSELF?: NO
1. IN THE PAST MONTH, HAVE YOU WISHED YOU WERE DEAD OR WISHED YOU COULD GO TO SLEEP AND NOT WAKE UP?: NO
6. HAVE YOU EVER DONE ANYTHING, STARTED TO DO ANYTHING, OR PREPARED TO DO ANYTHING TO END YOUR LIFE?: NO